# Patient Record
(demographics unavailable — no encounter records)

---

## 2024-12-04 NOTE — HEALTH RISK ASSESSMENT
[Good] : ~his/her~  mood as  good [Yes] : Yes [2 - 3 times a week (3 pts)] : 2 - 3  times a week (3 points) [1 or 2 (0 pts)] : 1 or 2 (0 points) [Never (0 pts)] : Never (0 points) [0] : 2) Feeling down, depressed, or hopeless: Not at all (0) [PHQ-2 Negative - No further assessment needed] : PHQ-2 Negative - No further assessment needed [Never] : Never [Patient reported mammogram was normal] : Patient reported mammogram was normal [Patient reported PAP Smear was normal] : Patient reported PAP Smear was normal [Patient reported bone density results were abnormal] : Patient reported bone density results were abnormal [Patient reported colonoscopy was normal] : Patient reported colonoscopy was normal [With Significant Other] : lives with significant other [With Family] : lives with family [Employed] : employed [] :  [# Of Children ___] : has [unfilled] children [Smoke Detector] : smoke detector [Carbon Monoxide Detector] : carbon monoxide detector [Seat Belt] :  uses seat belt [Sunscreen] : uses sunscreen [Reports changes in dental health] : Reports changes in dental health [Audit-CScore] : 3 [de-identified] : walks  [de-identified] : trying to be balanced [[id number]] : 0 [Reports changes in hearing] : Reports no changes in hearing [Reports changes in vision] : Reports no changes in vision [MammogramDate] : 1/24 [PapSmearDate] : 01/24 [BoneDensityDate] : 01/24 [BoneDensityComments] : osteopenia [ColonoscopyDate] : 09/24 [ColonoscopyComments] : sister passed from colon cancer 2023 [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

## 2024-12-04 NOTE — ASSESSMENT
[FreeTextEntry1] : #HCM - Patient presenting for annual physical exam - Accepts flu vaccine today, administered in right deltoid. Patient tolerated well - Up to date with pap smear - Up to date with mammogram - Up to date with colonoscopy, repeat in 2027 - ECG today shows NSR, T wave abnormality seen on previous ECGs - Will check routine blood work today and call patient with results

## 2024-12-04 NOTE — HISTORY OF PRESENT ILLNESS
[FreeTextEntry1] : CPE [de-identified] : 59yo female with no significant PMH presenting to the office for annual physical exam.  She is feeling well today with no acute concerns.   She underwent colonoscopy 9/2024, had normal results. Asked to return in 3 years. Sister passed from colon cancer 2023.

## 2024-12-04 NOTE — REVIEW OF SYSTEMS
[Fever] : no fever [Chills] : no chills [Fatigue] : no fatigue [Nasal Discharge] : no nasal discharge [Sore Throat] : no sore throat [Chest Pain] : no chest pain [Palpitations] : no palpitations [Shortness Of Breath] : no shortness of breath [Cough] : no cough [Abdominal Pain] : no abdominal pain [Nausea] : no nausea [Constipation] : no constipation [Vomiting] : no vomiting [Dysuria] : no dysuria [Frequency] : no frequency [Joint Pain] : no joint pain [Muscle Pain] : no muscle pain [Itching] : no itching [Skin Rash] : no skin rash [Headache] : no headache [Dizziness] : no dizziness

## 2024-12-04 NOTE — PHYSICAL EXAM
[No Acute Distress] : no acute distress [Well-Appearing] : well-appearing [Normal Sclera/Conjunctiva] : normal sclera/conjunctiva [EOMI] : extraocular movements intact [Normal Outer Ear/Nose] : the outer ears and nose were normal in appearance [Normal Oropharynx] : the oropharynx was normal [No Lymphadenopathy] : no lymphadenopathy [Supple] : supple [Thyroid Normal, No Nodules] : the thyroid was normal and there were no nodules present [No Accessory Muscle Use] : no accessory muscle use [Clear to Auscultation] : lungs were clear to auscultation bilaterally [Normal Rate] : normal rate  [Regular Rhythm] : with a regular rhythm [Normal S1, S2] : normal S1 and S2 [No Murmur] : no murmur heard [No Edema] : there was no peripheral edema [Soft] : abdomen soft [Non Tender] : non-tender [Non-distended] : non-distended [No Rash] : no rash [Coordination Grossly Intact] : coordination grossly intact [Normal Gait] : normal gait [Normal Affect] : the affect was normal [Normal Insight/Judgement] : insight and judgment were intact

## 2025-01-03 NOTE — PHYSICAL EXAM
[Chaperone Present] : A chaperone was present in the examining room during all aspects of the physical examination [[id number]] : A chaperone was present during the pelvic exam. [Appropriately responsive] : appropriately responsive [Alert] : alert [No Acute Distress] : no acute distress [Soft] : soft [Non-tender] : non-tender [Non-distended] : non-distended [Oriented x3] : oriented x3 [Examination Of The Breasts] : a normal appearance [No Masses] : no breast masses were palpable [Labia Majora] : normal [Labia Minora] : normal [Atrophy] : atrophy [Dry Mucosa] : dry mucosa [No Bleeding] : There was no active vaginal bleeding [Normal] : normal [Uterine Adnexae] : normal

## 2025-01-03 NOTE — PHYSICAL EXAM
[Chaperone Present] : A chaperone was present in the examining room during all aspects of the physical examination [[id number]] : A chaperone was present during the pelvic exam. [Appropriately responsive] : appropriately responsive [Alert] : alert [No Acute Distress] : no acute distress [Soft] : soft [Non-tender] : non-tender [Non-distended] : non-distended [Oriented x3] : oriented x3 [Examination Of The Breasts] : a normal appearance [No Masses] : no breast masses were palpable [Labia Majora] : normal [Labia Minora] : normal [Atrophy] : atrophy [Dry Mucosa] : dry mucosa [No Bleeding] : There was no active vaginal bleeding [Normal] : normal [Uterine Adnexae] : normal

## 2025-01-07 NOTE — PLAN
[FreeTextEntry1] : Pap deferred today as per [facility] guidelines.    Prescription for mammogram screening and breast US given. Self-breast exam reviewed.  She will continue with weight bearing exercises for osteopenia.  She is up to date with colon cancer screening  She will follow up annually and as needed.

## 2025-01-07 NOTE — HISTORY OF PRESENT ILLNESS
[HPV test offered] : HPV test offered [Y] : Positive pregnancy history [Menarche Age: ____] : age at menarche was [unfilled] [Currently Active] : currently active [Men] : men [Mammogramdate] : 01/30/2024 [TextBox_19] : BR2 [BreastSonogramDate] : 01/30/2024 [TextBox_25] : BR2 [PapSmeardate] : 12/28/2023 [TextBox_31] : Negative [BoneDensityDate] : 01/30/2024 [TextBox_37] : Osteopenia  [ColonoscopyDate] : 09/12/2024 [TextBox_43] : Polyp, Pt was told to repeat Colonoscopy in 3 years.   [HPVDate] : 12/28/2023 [TextBox_78] : Negative [LMPDate] : 09/01/2014 [de-identified] : had a Tubal Ligation.  [PGHxTotal] : 2 [facility] [FreeTextEntry1] : 09/01/2014

## 2025-01-07 NOTE — HISTORY OF PRESENT ILLNESS
[HPV test offered] : HPV test offered [Y] : Positive pregnancy history [Menarche Age: ____] : age at menarche was [unfilled] [Currently Active] : currently active [Men] : men [Mammogramdate] : 01/30/2024 [TextBox_19] : BR2 [BreastSonogramDate] : 01/30/2024 [TextBox_25] : BR2 [PapSmeardate] : 12/28/2023 [TextBox_31] : Negative [BoneDensityDate] : 01/30/2024 [TextBox_37] : Osteopenia  [ColonoscopyDate] : 09/12/2024 [TextBox_43] : Polyp, Pt was told to repeat Colonoscopy in 3 years.   [HPVDate] : 12/28/2023 [TextBox_78] : Negative [LMPDate] : 09/01/2014 [de-identified] : had a Tubal Ligation.  [PGHxTotal] : 2 [[facility] [[facility] [FreeTextEntry1] : 09/01/2014

## 2025-01-07 NOTE — END OF VISIT
[FreeTextEntry3] : I, Brigette Wolff solely acted as scribe for Dr. Kayla Blackmon on [01/03/2025] All medical entries made by the Scribe were at my, Dr. Elliott, direction and personally dictated by me on [01/03/2025]. I have reviewed the chart and agree that the record accurately reflects my personal performance of the history, physical exam, assessment and plan. I have also personally directed, reviewed, and agreed with the chart.